# Patient Record
(demographics unavailable — no encounter records)

---

## 2025-05-06 NOTE — DISCUSSION/SUMMARY
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 I advised follow-up with GYN oncologist  Patient and daughter agreed Referral to GYN oncologist given

## 2025-05-06 NOTE — HISTORY OF PRESENT ILLNESS
[N] : Patient is not sexually active [TextBox_4] : GYNHX No history of fibroids, cysts, or STDs [Papeardate] : 1-2025 [PGHxTotal] : 3 [facility] : [facility] [[facility]] : 3 [FreeTextEntry1] :

## 2025-05-22 NOTE — ASSESSMENT
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  Patient risk for malignancy is her obesity with high BMI.  Patient also reports complaint of incontinence which appears to be stress incontinence.  Discussed with patient the option of management including surgical versus nonsurgical.  Explained to the patient surgery is definitive treatment which will result in removal of the uterus with cervix and bilateral fallopian tubes and ovaries.  The benefit of surgery is to exclude malignancy of the endometrium.  Providing hormone therapy may mask endometrial cancer.  Other options such as dilatation and curettage may be an option however I recommend hysterectomy as definitive treatment.  Understanding above, patient elected to proceed with recommendation and will be scheduled for robotic hysterectomy with BSO and vaginal colpopexy. Explained to the patient surgery will be performed by minimally invasive/robotic approach however surgery has inherent risks including but not limited to infection, bleeding which may require blood transfusion, DVT and or PE and although all measures will be taken to minimize these risks they cannot be eliminated 100% of the time.  Additionally there is a risk of injury to surrounding structures such as bladder, ureter, bowel or blood vessels and these injuries will be repaired immediately however some injuries may not present until postoperatively and may require operation.  Risk of injury to surrounding structures about 1 to 2%. Understanding above, patient elected to proceed with recommendation will be scheduled for robotic hysterectomy with BSO and vaginal colpopexy. 63 min was spent with pt during this visit

## 2025-05-22 NOTE — CHIEF COMPLAINT
[FreeTextEntry1] : Postmenopausal bleeding, reporting mass through cervix [Family Member] : family member

## 2025-05-22 NOTE — REVIEW OF SYSTEMS
[Negative] : Musculoskeletal [Abn Vag Bleeding] : abnormal vaginal bleeding [Incontinence] : incontinence

## 2025-05-22 NOTE — OB HISTORY
[Total Preg ___] : : [unfilled] [Full Term ___] : [unfilled] (full-term) [Vaginal ___] : [unfilled] vaginal delivery(s) [unknown] : the patient is unsure of the date of her LMP

## 2025-05-22 NOTE — PHYSICAL EXAM
[MA] : MA [FreeTextEntry2] : Colleen [Abnormal] : Adnexa(ae): Abnormal [Normal] : Anus and perineum: Normal sphincter tone, no masses, no prolapse. [de-identified] : Soft, nontender, nondistended, no hernia noted [de-identified] : Pelvic examination is limited due to body habitus but evidence of incontinence noted [Fully active, able to carry on all pre-disease performance without restriction] : Status 0 - Fully active, able to carry on all pre-disease performance without restriction

## 2025-05-22 NOTE — HISTORY OF PRESENT ILLNESS
[FreeTextEntry1] : 74-year-old female with vague history of postmenopausal bleeding and stress incontinence.  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  Patient was referred for management.